# Patient Record
Sex: FEMALE | URBAN - METROPOLITAN AREA
[De-identification: names, ages, dates, MRNs, and addresses within clinical notes are randomized per-mention and may not be internally consistent; named-entity substitution may affect disease eponyms.]

---

## 2020-12-07 ENCOUNTER — WEB ENCOUNTER (OUTPATIENT)
Dept: URBAN - METROPOLITAN AREA CLINIC 84 | Facility: CLINIC | Age: 40
End: 2020-12-07

## 2020-12-07 ENCOUNTER — OFFICE VISIT (OUTPATIENT)
Dept: URBAN - METROPOLITAN AREA CLINIC 84 | Facility: CLINIC | Age: 40
End: 2020-12-07
Payer: SELF-PAY

## 2020-12-07 DIAGNOSIS — R19.8 TENESMUS: ICD-10-CM

## 2020-12-07 DIAGNOSIS — K90.49 FOOD INTOLERANCE IN ADULT: ICD-10-CM

## 2020-12-07 DIAGNOSIS — R14.1 BLOATING AND GAS PAIN: ICD-10-CM

## 2020-12-07 PROCEDURE — 99203 OFFICE O/P NEW LOW 30 MIN: CPT | Performed by: INTERNAL MEDICINE

## 2020-12-07 PROCEDURE — G8417 CALC BMI ABV UP PARAM F/U: HCPCS | Performed by: INTERNAL MEDICINE

## 2020-12-07 PROCEDURE — G8427 DOCREV CUR MEDS BY ELIG CLIN: HCPCS | Performed by: INTERNAL MEDICINE

## 2020-12-07 PROCEDURE — 1036F TOBACCO NON-USER: CPT | Performed by: INTERNAL MEDICINE

## 2020-12-07 RX ORDER — SIMETHICONE 250 MG/1
1 CAPSULE AFTER MEALS AND AT BEDTIME AS NEEDED CAPSULE, GELATIN COATED ORAL TWICE A DAY
Qty: 60 CAPSULE | Refills: 3 | OUTPATIENT
Start: 2020-12-07

## 2020-12-07 RX ORDER — HYOSCYAMINE SULFATE 0.12 MG/1
TAKE 1 TABLET (0.125 MG) BY ORAL ROUTE 3 TIMES PER DAY FOR 30 DAYS TABLET ORAL
Qty: 90 | Refills: 1 | OUTPATIENT

## 2020-12-07 NOTE — HPI-TODAY'S VISIT:
Patient present with 8 month hx of generalized abdominal pain. Patient was dx'd with gerd. Pt rx'd omeprazole without improvement. She exepriences severe epigastric pain with meals and admits to bloating/gas.

## 2020-12-22 ENCOUNTER — OFFICE VISIT (OUTPATIENT)
Dept: URBAN - METROPOLITAN AREA CLINIC 84 | Facility: CLINIC | Age: 40
End: 2020-12-22

## 2021-02-01 ENCOUNTER — OFFICE VISIT (OUTPATIENT)
Dept: URBAN - METROPOLITAN AREA CLINIC 84 | Facility: CLINIC | Age: 41
End: 2021-02-01
Payer: SELF-PAY

## 2021-02-01 DIAGNOSIS — K90.49 FOOD INTOLERANCE IN ADULT: ICD-10-CM

## 2021-02-01 DIAGNOSIS — R19.8 TENESMUS: ICD-10-CM

## 2021-02-01 DIAGNOSIS — R14.1 BLOATING AND GAS PAIN: ICD-10-CM

## 2021-02-01 PROCEDURE — 1036F TOBACCO NON-USER: CPT | Performed by: INTERNAL MEDICINE

## 2021-02-01 PROCEDURE — G8420 CALC BMI NORM PARAMETERS: HCPCS | Performed by: INTERNAL MEDICINE

## 2021-02-01 PROCEDURE — G8427 DOCREV CUR MEDS BY ELIG CLIN: HCPCS | Performed by: INTERNAL MEDICINE

## 2021-02-01 PROCEDURE — 99212 OFFICE O/P EST SF 10 MIN: CPT | Performed by: INTERNAL MEDICINE

## 2021-02-01 RX ORDER — HYOSCYAMINE SULFATE 0.12 MG/1
TAKE 1 TABLET (0.125 MG) BY ORAL ROUTE 3 TIMES PER DAY FOR 30 DAYS TABLET ORAL
Qty: 90 | Refills: 1 | Status: DISCONTINUED | COMMUNITY

## 2021-02-01 RX ORDER — SIMETHICONE 250 MG/1
1 CAPSULE AFTER MEALS AND AT BEDTIME AS NEEDED CAPSULE, GELATIN COATED ORAL TWICE A DAY
Qty: 60 CAPSULE | Refills: 3 | Status: DISCONTINUED | COMMUNITY
Start: 2020-12-07

## 2021-02-03 ENCOUNTER — TELEPHONE ENCOUNTER (OUTPATIENT)
Dept: URBAN - METROPOLITAN AREA CLINIC 92 | Facility: CLINIC | Age: 41
End: 2021-02-03

## 2021-02-09 ENCOUNTER — TELEPHONE ENCOUNTER (OUTPATIENT)
Dept: URBAN - METROPOLITAN AREA CLINIC 23 | Facility: CLINIC | Age: 41
End: 2021-02-09

## 2021-02-09 RX ORDER — HYOSCYAMINE SULFATE 0.12 MG/1
TAKE 1 TABLET (0.125 MG) BY ORAL ROUTE 3 TIMES PER DAY FOR 30 DAYS TABLET ORAL
Qty: 90 | Refills: 1

## 2021-02-09 RX ORDER — SIMETHICONE 250 MG/1
1 CAPSULE AFTER MEALS AND AT BEDTIME AS NEEDED CAPSULE, GELATIN COATED ORAL TWICE A DAY
Qty: 60 CAPSULE | Refills: 3
Start: 2020-12-07

## 2021-02-11 ENCOUNTER — WEB ENCOUNTER (OUTPATIENT)
Dept: URBAN - METROPOLITAN AREA CLINIC 84 | Facility: CLINIC | Age: 41
End: 2021-02-11

## 2021-02-15 ENCOUNTER — WEB ENCOUNTER (OUTPATIENT)
Dept: URBAN - METROPOLITAN AREA CLINIC 84 | Facility: CLINIC | Age: 41
End: 2021-02-15

## 2021-02-16 ENCOUNTER — TELEPHONE ENCOUNTER (OUTPATIENT)
Dept: URBAN - METROPOLITAN AREA CLINIC 92 | Facility: CLINIC | Age: 41
End: 2021-02-16

## 2021-02-16 RX ORDER — HYOSCYAMINE SULFATE 0.12 MG/1
TAKE 1 TABLET (0.125 MG) BY ORAL ROUTE 3 TIMES PER DAY FOR 30 DAYS TABLET ORAL
Qty: 90 | Refills: 1

## 2021-02-16 RX ORDER — SIMETHICONE 250 MG/1
1 CAPSULE AFTER MEALS AND AT BEDTIME AS NEEDED CAPSULE, GELATIN COATED ORAL TWICE A DAY
Qty: 60 CAPSULE | Refills: 3
Start: 2020-12-07

## 2021-08-02 ENCOUNTER — OFFICE VISIT (OUTPATIENT)
Dept: URBAN - METROPOLITAN AREA CLINIC 84 | Facility: CLINIC | Age: 41
End: 2021-08-02

## 2021-08-30 ENCOUNTER — OFFICE VISIT (OUTPATIENT)
Dept: URBAN - METROPOLITAN AREA CLINIC 84 | Facility: CLINIC | Age: 41
End: 2021-08-30

## 2021-08-30 RX ORDER — HYOSCYAMINE SULFATE 0.12 MG/1
TAKE 1 TABLET (0.125 MG) BY ORAL ROUTE 3 TIMES PER DAY FOR 30 DAYS TABLET ORAL
Qty: 90 | Refills: 1 | COMMUNITY

## 2021-08-30 RX ORDER — SIMETHICONE 250 MG/1
1 CAPSULE AFTER MEALS AND AT BEDTIME AS NEEDED CAPSULE, GELATIN COATED ORAL TWICE A DAY
Qty: 60 CAPSULE | Refills: 3 | COMMUNITY
Start: 2020-12-07

## 2021-10-05 ENCOUNTER — OFFICE VISIT (OUTPATIENT)
Dept: URBAN - METROPOLITAN AREA CLINIC 25 | Facility: CLINIC | Age: 41
End: 2021-10-05

## 2021-10-05 RX ORDER — HYOSCYAMINE SULFATE 0.12 MG/1
TAKE 1 TABLET (0.125 MG) BY ORAL ROUTE 3 TIMES PER DAY FOR 30 DAYS TABLET ORAL
Qty: 90 | Refills: 1 | COMMUNITY

## 2021-10-05 RX ORDER — SIMETHICONE 250 MG/1
1 CAPSULE AFTER MEALS AND AT BEDTIME AS NEEDED CAPSULE, GELATIN COATED ORAL TWICE A DAY
Qty: 60 CAPSULE | Refills: 3 | COMMUNITY
Start: 2020-12-07

## 2021-10-18 ENCOUNTER — OFFICE VISIT (OUTPATIENT)
Dept: URBAN - METROPOLITAN AREA CLINIC 84 | Facility: CLINIC | Age: 41
End: 2021-10-18

## 2021-10-18 RX ORDER — HYOSCYAMINE SULFATE 0.12 MG/1
TAKE 1 TABLET (0.125 MG) BY ORAL ROUTE 3 TIMES PER DAY FOR 30 DAYS TABLET ORAL
Qty: 90 | Refills: 1 | Status: ACTIVE | COMMUNITY

## 2021-10-18 RX ORDER — SIMETHICONE 250 MG/1
1 CAPSULE AFTER MEALS AND AT BEDTIME AS NEEDED CAPSULE, GELATIN COATED ORAL TWICE A DAY
Qty: 60 CAPSULE | Refills: 3 | Status: ACTIVE | COMMUNITY
Start: 2020-12-07

## 2022-01-10 ENCOUNTER — DASHBOARD ENCOUNTERS (OUTPATIENT)
Age: 42
End: 2022-01-10

## 2022-01-10 ENCOUNTER — OFFICE VISIT (OUTPATIENT)
Dept: URBAN - METROPOLITAN AREA CLINIC 84 | Facility: CLINIC | Age: 42
End: 2022-01-10
Payer: SELF-PAY

## 2022-01-10 VITALS
HEART RATE: 68 BPM | DIASTOLIC BLOOD PRESSURE: 76 MMHG | WEIGHT: 164.4 LBS | TEMPERATURE: 97.8 F | SYSTOLIC BLOOD PRESSURE: 112 MMHG | BODY MASS INDEX: 28.07 KG/M2 | HEIGHT: 64 IN

## 2022-01-10 DIAGNOSIS — R14.1 BLOATING AND GAS PAIN: ICD-10-CM

## 2022-01-10 DIAGNOSIS — R19.8 TENESMUS: ICD-10-CM

## 2022-01-10 DIAGNOSIS — A04.8 H. PYLORI INFECTION: ICD-10-CM

## 2022-01-10 DIAGNOSIS — K90.49 FOOD INTOLERANCE IN ADULT: ICD-10-CM

## 2022-01-10 PROBLEM — 267053000 TENESMUS: Status: ACTIVE | Noted: 2022-01-10

## 2022-01-10 PROBLEM — 235719002 INTOLERANCE TO FOOD (FINDING): Status: ACTIVE | Noted: 2020-12-07

## 2022-01-10 PROBLEM — 271832001 FLATULENCE, ERUCTATION AND GAS PAIN: Status: ACTIVE | Noted: 2022-01-10

## 2022-01-10 PROCEDURE — 99214 OFFICE O/P EST MOD 30 MIN: CPT | Performed by: INTERNAL MEDICINE

## 2022-01-10 RX ORDER — HYOSCYAMINE SULFATE 0.12 MG/1
TAKE 1 TABLET (0.125 MG) BY ORAL ROUTE 3 TIMES PER DAY FOR 30 DAYS TABLET ORAL
Qty: 90 | Refills: 1 | Status: DISCONTINUED | COMMUNITY

## 2022-01-10 RX ORDER — SIMETHICONE 250 MG/1
1 CAPSULE AFTER MEALS AND AT BEDTIME AS NEEDED CAPSULE, GELATIN COATED ORAL TWICE A DAY
Qty: 60 CAPSULE | Refills: 3 | Status: DISCONTINUED | COMMUNITY
Start: 2020-12-07

## 2022-01-10 RX ORDER — HYOSCYAMINE SULFATE 0.12 MG/1
1 TABLET TABLET ORAL THREE TIMES A DAY
Qty: 90 TABLET | Refills: 4 | OUTPATIENT
Start: 2022-01-10 | End: 2022-06-09

## 2022-01-10 RX ORDER — POLYETHYLENE GLYCOL 3350 17 G/17G
AS DIRECTED POWDER, FOR SOLUTION ORAL ONCE A DAY
Qty: 238 GRAM | Refills: 0 | OUTPATIENT
Start: 2022-01-10 | End: 2022-01-11

## 2022-01-10 NOTE — HPI-TODAY'S VISIT:
42 yo pt presents with h/o bloating/gas presents for f/u. She states that she was dx'd with h pylori recently and treated but has not had f/u confirmation.

## 2022-01-11 ENCOUNTER — OFFICE VISIT (OUTPATIENT)
Dept: URBAN - METROPOLITAN AREA CLINIC 83 | Facility: CLINIC | Age: 42
End: 2022-01-11
Payer: SELF-PAY

## 2022-01-11 DIAGNOSIS — A04.8 H. PYLORI INFECTION: ICD-10-CM

## 2022-01-11 PROBLEM — 721730009: Status: ACTIVE | Noted: 2022-01-10

## 2022-01-11 PROCEDURE — 83014 H PYLORI DRUG ADMIN: CPT | Performed by: INTERNAL MEDICINE

## 2022-01-20 ENCOUNTER — TELEPHONE ENCOUNTER (OUTPATIENT)
Dept: URBAN - METROPOLITAN AREA CLINIC 84 | Facility: CLINIC | Age: 42
End: 2022-01-20

## 2022-02-04 ENCOUNTER — OFFICE VISIT (OUTPATIENT)
Dept: URBAN - METROPOLITAN AREA SURGERY CENTER 20 | Facility: SURGERY CENTER | Age: 42
End: 2022-02-04